# Patient Record
Sex: MALE | Race: BLACK OR AFRICAN AMERICAN | NOT HISPANIC OR LATINO | Employment: UNEMPLOYED | URBAN - METROPOLITAN AREA
[De-identification: names, ages, dates, MRNs, and addresses within clinical notes are randomized per-mention and may not be internally consistent; named-entity substitution may affect disease eponyms.]

---

## 2024-03-03 ENCOUNTER — HOSPITAL ENCOUNTER (EMERGENCY)
Facility: HOSPITAL | Age: 24
Discharge: HOME | End: 2024-03-03
Attending: EMERGENCY MEDICINE | Admitting: EMERGENCY MEDICINE
Payer: MEDICAID

## 2024-03-03 VITALS
HEIGHT: 70 IN | SYSTOLIC BLOOD PRESSURE: 97 MMHG | RESPIRATION RATE: 12 BRPM | DIASTOLIC BLOOD PRESSURE: 53 MMHG | OXYGEN SATURATION: 99 % | HEART RATE: 60 BPM | BODY MASS INDEX: 17.18 KG/M2 | TEMPERATURE: 98 F | WEIGHT: 120 LBS

## 2024-03-03 DIAGNOSIS — F19.10 SUBSTANCE ABUSE (H): ICD-10-CM

## 2024-03-03 LAB
AMPHETAMINES UR QL SCN: ABNORMAL
BARBITURATES UR QL SCN: ABNORMAL
BENZODIAZ UR QL SCN: ABNORMAL
BZE UR QL SCN: ABNORMAL
CANNABINOIDS UR QL SCN: ABNORMAL
FENTANYL UR QL: ABNORMAL
OPIATES UR QL SCN: ABNORMAL
PCP QUAL URINE (ROCHE): ABNORMAL

## 2024-03-03 PROCEDURE — 99283 EMERGENCY DEPT VISIT LOW MDM: CPT

## 2024-03-03 PROCEDURE — 80307 DRUG TEST PRSMV CHEM ANLYZR: CPT | Performed by: EMERGENCY MEDICINE

## 2024-03-03 ASSESSMENT — COLUMBIA-SUICIDE SEVERITY RATING SCALE - C-SSRS
6. HAVE YOU EVER DONE ANYTHING, STARTED TO DO ANYTHING, OR PREPARED TO DO ANYTHING TO END YOUR LIFE?: NO
1. IN THE PAST MONTH, HAVE YOU WISHED YOU WERE DEAD OR WISHED YOU COULD GO TO SLEEP AND NOT WAKE UP?: NO
2. HAVE YOU ACTUALLY HAD ANY THOUGHTS OF KILLING YOURSELF IN THE PAST MONTH?: NO

## 2024-03-03 ASSESSMENT — ACTIVITIES OF DAILY LIVING (ADL)
ADLS_ACUITY_SCORE: 35
ADLS_ACUITY_SCORE: 33

## 2024-03-03 NOTE — DISCHARGE INSTRUCTIONS
Chemical Dependency Treatment    [facility]  [email or web address]    [phone] or [phone] [address] [place] [address]. [place]    Services include screening assessments, medically supervised detoxification, inpatient and outpatient evaluation and referral, combined inpatient to outpatient treatment sequences, family counseling and aftercare.         [place] [facility]  www.mntc.org    [phone]    [address] [facility]    Serves adults and teens (minimum age is 16); has short-term treatment and a long-term recovery program; uses 12-step, cognitive behavioral therapy, motivational enhancement; faithbased, and recovery management; high school on-site and Fliptop programming available         [facility]  www.[employer]/psy/[facility]    [phone]    [facility] Programs  [facility]        [facility]  www.sixdiBadAbroad    [phone]    [facility], [facility] 105[facility]         [facility] (Inpatient & Outpatient)    [email or web address]://[email or web address]/    [phone]    Phone consultation available 24/7    In-person Assessments    [address] [facility] [place], [facility] [address] [address]    [address] [place] [address] [address] [place] [address] [address] [place] [address] (Inpatient & Outpatient)    http://www.[facility].org/mental-health-addiction/about.html    [place], [place]    Contact  for Chemical Health Evaluation, [phone]    Funded by: Rule 25 in [facility]. Medical Assistance (MA) providers of [facility], [employer], Blue Cross, [employer], [employer], Medicare A&B. Private insurance reviewed case by case.         [facility] (Teresa-Based Inpatient & Outpatient)    http://[employer]/    [phone] [address]  [place] [address]         [facility]    http://www.[facility]//specialties/mental-health/adap.html    [phone]    Alcohol and Drug Abuse Program - [place]  [address] [facility], [address]  [place] [address]    Assessments are available during the day and on a walk-in basis Monday-Friday starting at 8am.         [facility]    [phone]    http://niallSolar[employer] Israel[employer].Supponor/    [address] [place] [address]         [facility] (Residential & Outpatient)    http://[facility].org/    Women s Programs: [phone], [address], [place] [address]         [facility] (Does Rule 25 Assessments)    http://www.[email or web address]/    [phone]    [address], [address], [place] [address]         [place]    http://www.[employer]/    [phone]    [address] [place] [address]    [address] [place] [address]    Rule 25 Assessments, Substance Abuse Assessments, Men s & Women s Programs         [facility]    https://www.[employer].Supponor/our-services/drug-alcohol-treatment    [phone], [address], [place] [address]    [phone], [address], [place], [place] [address] [phone], [address] [facility], [address], [place], [place] [address] [phone], [address], ([facility]), [place], [place] [id number] [phone], [address] [address], [place] [address]         National Jber on Drug Abuse    https://www.drugabuse.gov/nidamed-medical-health-professionals        Substance Use Treatment (Rule 25) Information -     To access chemical dependency treatment you must have an assessment complete or have an updatedassessment within 30 days of starting any program.    Information for this to be completed and to secure funding if you have medical assistance or no insurance can be found  through your [facility]'s Serv[employer]ant health intakeline.     If you have private insurance contact the customer service number on the back of your insurance card to determine the required steps for accessingservices through your insurance provider.     [facility] RULE 25 INFORMATION -   [place] - [phone]  [place] - [phone]  [place] - [phone]  [place] -[phone]  [facility] [phone]  [place] - [phone]  [place] - [phone]  [facility] [phone]    Once approved for funding you canconnect with a facility that does Rule 25 assessment.     The following facilities offer Rule 25 assessments -     [facility]  [phone]  [facility] - Outpatient Mental Health and Addiction   [address] [place] [address]    [facility] Outpatient Alcohol and Drug Abuse Program (ADAP)  [phone]  [address] [place] [address]  *Walk in assessments also available M-F starting at 8 am.     [facility]  [facility]   [phone]  [facility]  [address] [facility] [address]    [facility]   [phone]  [address] [place] [address]    [facility]  [phone] - tllpm 1clinic locations    [facility]   [phone]  [address] [place] [address] E  [facility]    [facility])  [phone]  [address] [facility]    [facility]  [phone]  [address] [facility] [address]    If youare intoxicated  You may be required to detox at a detox facility before starting treatment.    [facility]- [address] [place] [address].  [place], [place].    [phone]    [facility]: [phone]  [facility]: [phone]  [facility]: [phone]  [place]     *All information subject to change by facility.

## 2024-03-03 NOTE — ED TRIAGE NOTES
Patient states that he is in treatment for drug abuse. He states that the staff from the treatment center told him to go to a hospital to obtain evidence that he is free from drugs in his system.

## 2024-03-03 NOTE — ED PROVIDER NOTES
EMERGENCY DEPARTMENT ENCOUNTER      NAME: Zach Interiano  AGE: 23 year old male  YOB: 2000  MRN: [id number]  EVALUATION DATE & TIME: 3/3/2024  9:32 AM    PCP: No primary care provider on file.    ED PROVIDER: Belkis Clifton MD    Chief Complaint   Patient presents with    Seeking Drug Screen         FINAL IMPRESSION:  1. Substance abuse (H)          ED COURSE & MEDICAL DECISION MAKING:    Pertinent Labs & Imaging studies reviewed. (See chart for details)  23 year old male with history of substance abuse who presents to the Emergency Department for evaluation of requesting drug testing in order to get into treatment program today.  States he is simply feels tired, is otherwise asymptomatic.  Urine drug screen ordered, positive for amphetamines cannabinoids and fentanyl.  Safe for discharge.  Provided with copy of his drug screen for his treatment facility in addition to outpatient resources for substance abuse.           Medical Decision Making    History:  Supplemental history from: Documented in chart  External Record(s) reviewed: Documented in chart    Work Up:  Chart documentation includes differential considered and any EKGs or imaging independently interpreted by provider, see MDM  In additional to work up documented, I considered the following work up: see MDM    External consultation:  Discussion of management with another provider: N/A    Complicating factors:  Care impacted by chronic illness: N/A  Care affected by social determinants of health: Access to Medical Care and Alcohol Abuse and/or Recreational Drug Use    Disposition considerations: Discharge. No recommendations on prescription strength medication(s). See documentation for any additional details.        At the conclusion of the encounter I discussed the results of all of the tests and the disposition. The questions were answered. The patient or family acknowledged understanding and was agreeable with the care  "plan.      MEDICATIONS GIVEN IN THE EMERGENCY:  Medications - No data to display    NEW PRESCRIPTIONS STARTED AT TODAY'S ER VISIT  New Prescriptions    No medications on file          =================================================================    HPI    Patient information was obtained from: patient    Use of Intrepreter: N/A      Zach Oliver Interiano is a 23 year old male with pertinent medical history of substance abuse who presents requesting drug screen.  Patient states that he has a history of substance abuse, primarily fentanyl.  Last smoked fentanyl yesterday.  Is going into treatment at [facility], and states that they sent him here today for drug testing to \"prove that I do not have any drugs in my system\".  Patient admits to last smoking fentanyl yesterday, feels like he might be starting to undergo some withdrawal symptomatology, but cannot elaborate on this further.  States he just feels tired.  No nausea, vomiting, diarrhea.      PAST MEDICAL HISTORY:  No past medical history on file.    PAST SURGICAL HISTORY:  No past surgical history on file.    CURRENT MEDICATIONS:    None       ALLERGIES:  No Known Allergies    FAMILY HISTORY:  No family history on file.    SOCIAL HISTORY:        VITALS:  Patient Vitals for the past 24 hrs:   BP Temp Temp src Pulse Resp SpO2 Height Weight   03/03/24 0930 91/53 98  F (36.7  C) Oral 64 16 99 % 1.778 m (5' 10\") 54.4 kg (120 lb)       PHYSICAL EXAM    General Appearance: Well-appearing, well-nourished, heavily bundled with clothing  Head:  Normocephalic, atraumatic  Eyes:   conjunctiva/corneas clear  ENT:   membranes are moist without pallor  Cardio:  Regular rate and rhythm  Pulm:  No respiratory distress  Extremities: Normal gait  Skin:  Skin warm, dry, no rashes  Neuro:  Alert and oriented ×3     RADIOLOGY/LABS:  Reviewed all pertinent imaging. Please see official radiology report. All pertinent labs reviewed and interpreted.    Results " for orders placed or performed during the hospital encounter of 03/03/24   Urine Drug Screen Panel   Result Value Ref Range    Amphetamines Urine Screen Positive (A) Screen Negative    Barbituates Urine Screen Negative Screen Negative    Benzodiazepine Urine Screen Negative Screen Negative    Cannabinoids Urine Screen Positive (A) Screen Negative    Cocaine Urine Screen Negative Screen Negative    Fentanyl Qual Urine Screen Positive (A) Screen Negative    Opiates Urine Screen Negative Screen Negative    PCP Urine Screen Negative Screen Negative           Belkis Clifton MD  Emergency Medicine  [facility] EMERGENCY DEPARTMENT  [address] [place] [id number] [phone]  Dept: [phone]     Belkis Clifton MD  03/03/24 1026

## 2024-03-20 ENCOUNTER — APPOINTMENT (OUTPATIENT)
Dept: RADIOLOGY | Facility: HOSPITAL | Age: 24
End: 2024-03-20
Attending: EMERGENCY MEDICINE
Payer: MEDICAID

## 2024-03-20 ENCOUNTER — HOSPITAL ENCOUNTER (EMERGENCY)
Facility: HOSPITAL | Age: 24
Discharge: HOME | End: 2024-03-20
Attending: EMERGENCY MEDICINE | Admitting: EMERGENCY MEDICINE
Payer: MEDICAID

## 2024-03-20 VITALS
HEART RATE: 120 BPM | DIASTOLIC BLOOD PRESSURE: 78 MMHG | SYSTOLIC BLOOD PRESSURE: 143 MMHG | TEMPERATURE: 98.8 F | OXYGEN SATURATION: 99 % | RESPIRATION RATE: 20 BRPM

## 2024-03-20 DIAGNOSIS — S42.401A CLOSED FRACTURE OF DISTAL END OF RIGHT HUMERUS, UNSPECIFIED FRACTURE MORPHOLOGY, INITIAL ENCOUNTER: ICD-10-CM

## 2024-03-20 DIAGNOSIS — M25.521 RIGHT ELBOW PAIN: ICD-10-CM

## 2024-03-20 PROCEDURE — 73080 X-RAY EXAM OF ELBOW: CPT | Mod: RT

## 2024-03-20 PROCEDURE — 250N000013 HC RX MED GY IP 250 OP 250 PS 637: Performed by: EMERGENCY MEDICINE

## 2024-03-20 PROCEDURE — 24560 CLTX HUM EPCNDYLR FX WO MNPJ: CPT | Mod: RT

## 2024-03-20 PROCEDURE — 99284 EMERGENCY DEPT VISIT MOD MDM: CPT | Mod: 25

## 2024-03-20 RX ORDER — OXYCODONE HYDROCHLORIDE 5 MG/1
5 TABLET ORAL EVERY 4 HOURS PRN
Qty: 12 TABLET | Refills: 0 | Status: SHIPPED | OUTPATIENT
Start: 2024-03-20 | End: 2024-03-24

## 2024-03-20 RX ORDER — IBUPROFEN 200 MG
400 TABLET ORAL EVERY 8 HOURS PRN
Qty: 60 TABLET | Refills: 0 | Status: SHIPPED | OUTPATIENT
Start: 2024-03-20

## 2024-03-20 RX ORDER — ACETAMINOPHEN 500 MG
1000 TABLET ORAL EVERY 6 HOURS PRN
Qty: 100 TABLET | Refills: 0 | Status: SHIPPED | OUTPATIENT
Start: 2024-03-20

## 2024-03-20 RX ORDER — IBUPROFEN 600 MG/1
600 TABLET, FILM COATED ORAL ONCE
Status: COMPLETED | OUTPATIENT
Start: 2024-03-20 | End: 2024-03-20

## 2024-03-20 RX ORDER — OXYCODONE AND ACETAMINOPHEN 5; 325 MG/1; MG/1
1 TABLET ORAL ONCE
Status: COMPLETED | OUTPATIENT
Start: 2024-03-20 | End: 2024-03-20

## 2024-03-20 RX ADMIN — OXYCODONE HYDROCHLORIDE AND ACETAMINOPHEN 1 TABLET: 5; 325 TABLET ORAL at 20:11

## 2024-03-20 RX ADMIN — IBUPROFEN 600 MG: 600 TABLET, FILM COATED ORAL at 20:11

## 2024-03-20 ASSESSMENT — COLUMBIA-SUICIDE SEVERITY RATING SCALE - C-SSRS
1. IN THE PAST MONTH, HAVE YOU WISHED YOU WERE DEAD OR WISHED YOU COULD GO TO SLEEP AND NOT WAKE UP?: NO
6. HAVE YOU EVER DONE ANYTHING, STARTED TO DO ANYTHING, OR PREPARED TO DO ANYTHING TO END YOUR LIFE?: NO
2. HAVE YOU ACTUALLY HAD ANY THOUGHTS OF KILLING YOURSELF IN THE PAST MONTH?: NO

## 2024-03-20 ASSESSMENT — ACTIVITIES OF DAILY LIVING (ADL)
ADLS_ACUITY_SCORE: 35
ADLS_ACUITY_SCORE: 35
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
## 2024-03-21 NOTE — DISCHARGE INSTRUCTIONS
You were seen in the emergency department today for elbow pain and swelling.  As we discussed, your x-ray showed that you broke one of the bones in your elbow.     To help with pain:  - Ice the injury for 20 minutes, 3-5 times per day (or up to every 2 hours as needed) for the first 24-72 hours. You can either use an ice pack or plastic bag filled with ice, cover either one with a damp cloth to prevent the cold from burning your skin.   - Keep the sling on when you're up and moving during the day.   - Prop your arm up on a pillow at night to elevate it and help with swelling.   - Acetaminophen (Tylenol) 650 mg every 8 hours. Do not take more than 3000 mg per day.   - Ibuprofen (Motrin, Advil) 400-600 mg every 6 hours. Do not take more than 3200 mg per day.   - Oxycodone 1-2 tablets every 4-6 hours for the ijan25-12 hours. Only use these for severe pain that does not get better with tylenol and ibuprofen. This may make you feel drowsy so you should try to take it at night to lessen the pain and help you sleep.     You should take ibuprofen and tylenol for baseline pain. Write down the times you are taking both medications to ensure appropriate time in between doses.    If tylenol and ibuprofen are not enough, you can take the oxycodone. This does not contain acetaminophen so it is safe to take the tylenol/acetaminophen at the same time.   Note: Oxycodone is a strong narcotic pain medication that can lead to addiction and should be used carefully.  Please don't take more than the recommended dose and limit your use of this medication as much as possible.  Please don't take this medication with other medications or drugs that are also sedating or cause you to feel sleepy (alcohol, benzodiazepines, etc) because it may impair your ability to breathe.  In addition, please do not take oxycodone prior to performing activities such as driving, operating power tools, or other activities that carry a risk of bodily harm.      You will likely find that you are more sore over the next 1-2 days, but please return to the Emergency Department if you have a significant increase in pain, difficulty breathing, numbness, weakness, or any other new or concerning symptoms. Otherwise please follow up with the orthopedic surgery team in 1-2 days for recheck.    Below is some information that you might find informative and useful.    Thank you for choosing [facility]. It was a pleasure taking care of you today!  - Dr. Keesha White
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
